# Patient Record
Sex: MALE | Race: WHITE | NOT HISPANIC OR LATINO | URBAN - METROPOLITAN AREA
[De-identification: names, ages, dates, MRNs, and addresses within clinical notes are randomized per-mention and may not be internally consistent; named-entity substitution may affect disease eponyms.]

---

## 2020-01-17 ENCOUNTER — OUTPATIENT (OUTPATIENT)
Dept: OUTPATIENT SERVICES | Facility: HOSPITAL | Age: 70
LOS: 1 days | End: 2020-01-17
Payer: MEDICARE

## 2020-01-17 VITALS
HEIGHT: 67 IN | TEMPERATURE: 98 F | DIASTOLIC BLOOD PRESSURE: 82 MMHG | SYSTOLIC BLOOD PRESSURE: 151 MMHG | RESPIRATION RATE: 16 BRPM | HEART RATE: 76 BPM | WEIGHT: 315 LBS

## 2020-01-17 DIAGNOSIS — Z98.890 OTHER SPECIFIED POSTPROCEDURAL STATES: Chronic | ICD-10-CM

## 2020-01-17 DIAGNOSIS — Z98.84 BARIATRIC SURGERY STATUS: Chronic | ICD-10-CM

## 2020-01-17 DIAGNOSIS — Z01.818 ENCOUNTER FOR OTHER PREPROCEDURAL EXAMINATION: ICD-10-CM

## 2020-01-17 DIAGNOSIS — K21.9 GASTRO-ESOPHAGEAL REFLUX DISEASE WITHOUT ESOPHAGITIS: ICD-10-CM

## 2020-01-17 LAB
ANION GAP SERPL CALC-SCNC: 4 MMOL/L — LOW (ref 5–17)
APTT BLD: 30.7 SEC — SIGNIFICANT CHANGE UP (ref 27.5–36.3)
BASOPHILS # BLD AUTO: 0.02 K/UL — SIGNIFICANT CHANGE UP (ref 0–0.2)
BASOPHILS NFR BLD AUTO: 0.2 % — SIGNIFICANT CHANGE UP (ref 0–2)
BUN SERPL-MCNC: 15 MG/DL — SIGNIFICANT CHANGE UP (ref 7–23)
CALCIUM SERPL-MCNC: 9 MG/DL — SIGNIFICANT CHANGE UP (ref 8.5–10.1)
CHLORIDE SERPL-SCNC: 106 MMOL/L — SIGNIFICANT CHANGE UP (ref 96–108)
CO2 SERPL-SCNC: 30 MMOL/L — SIGNIFICANT CHANGE UP (ref 22–31)
CREAT SERPL-MCNC: 1.27 MG/DL — SIGNIFICANT CHANGE UP (ref 0.5–1.3)
EOSINOPHIL # BLD AUTO: 0.19 K/UL — SIGNIFICANT CHANGE UP (ref 0–0.5)
EOSINOPHIL NFR BLD AUTO: 2.2 % — SIGNIFICANT CHANGE UP (ref 0–6)
GLUCOSE SERPL-MCNC: 126 MG/DL — HIGH (ref 70–99)
HBA1C BLD-MCNC: 7.1 % — HIGH (ref 4–5.6)
HCT VFR BLD CALC: 41.6 % — SIGNIFICANT CHANGE UP (ref 39–50)
HGB BLD-MCNC: 13.4 G/DL — SIGNIFICANT CHANGE UP (ref 13–17)
IMM GRANULOCYTES NFR BLD AUTO: 0.5 % — SIGNIFICANT CHANGE UP (ref 0–1.5)
INR BLD: 1.4 RATIO — HIGH (ref 0.88–1.16)
LYMPHOCYTES # BLD AUTO: 2.15 K/UL — SIGNIFICANT CHANGE UP (ref 1–3.3)
LYMPHOCYTES # BLD AUTO: 24.7 % — SIGNIFICANT CHANGE UP (ref 13–44)
MCHC RBC-ENTMCNC: 29.8 PG — SIGNIFICANT CHANGE UP (ref 27–34)
MCHC RBC-ENTMCNC: 32.2 GM/DL — SIGNIFICANT CHANGE UP (ref 32–36)
MCV RBC AUTO: 92.7 FL — SIGNIFICANT CHANGE UP (ref 80–100)
MONOCYTES # BLD AUTO: 0.55 K/UL — SIGNIFICANT CHANGE UP (ref 0–0.9)
MONOCYTES NFR BLD AUTO: 6.3 % — SIGNIFICANT CHANGE UP (ref 2–14)
NEUTROPHILS # BLD AUTO: 5.77 K/UL — SIGNIFICANT CHANGE UP (ref 1.8–7.4)
NEUTROPHILS NFR BLD AUTO: 66.1 % — SIGNIFICANT CHANGE UP (ref 43–77)
PLATELET # BLD AUTO: 212 K/UL — SIGNIFICANT CHANGE UP (ref 150–400)
POTASSIUM SERPL-MCNC: 4 MMOL/L — SIGNIFICANT CHANGE UP (ref 3.5–5.3)
POTASSIUM SERPL-SCNC: 4 MMOL/L — SIGNIFICANT CHANGE UP (ref 3.5–5.3)
PROTHROM AB SERPL-ACNC: 15.7 SEC — HIGH (ref 10–12.9)
RBC # BLD: 4.49 M/UL — SIGNIFICANT CHANGE UP (ref 4.2–5.8)
RBC # FLD: 13 % — SIGNIFICANT CHANGE UP (ref 10.3–14.5)
SODIUM SERPL-SCNC: 140 MMOL/L — SIGNIFICANT CHANGE UP (ref 135–145)
WBC # BLD: 8.72 K/UL — SIGNIFICANT CHANGE UP (ref 3.8–10.5)
WBC # FLD AUTO: 8.72 K/UL — SIGNIFICANT CHANGE UP (ref 3.8–10.5)

## 2020-01-17 PROCEDURE — 85730 THROMBOPLASTIN TIME PARTIAL: CPT

## 2020-01-17 PROCEDURE — 85025 COMPLETE CBC W/AUTO DIFF WBC: CPT

## 2020-01-17 PROCEDURE — 93010 ELECTROCARDIOGRAM REPORT: CPT

## 2020-01-17 PROCEDURE — 36415 COLL VENOUS BLD VENIPUNCTURE: CPT

## 2020-01-17 PROCEDURE — G0463: CPT | Mod: 25

## 2020-01-17 PROCEDURE — 83036 HEMOGLOBIN GLYCOSYLATED A1C: CPT

## 2020-01-17 PROCEDURE — 85610 PROTHROMBIN TIME: CPT

## 2020-01-17 PROCEDURE — 80048 BASIC METABOLIC PNL TOTAL CA: CPT

## 2020-01-17 PROCEDURE — 93005 ELECTROCARDIOGRAM TRACING: CPT

## 2020-01-17 NOTE — H&P PST ADULT - NSICDXFAMILYHX_GEN_ALL_CORE_FT
FAMILY HISTORY:  Family history of Alzheimer's disease, mother  Family history of gastric cancer, father  FH: diabetes mellitus, mother

## 2020-01-17 NOTE — H&P PST ADULT - NSICDXPASTSURGICALHX_GEN_ALL_CORE_FT
PAST SURGICAL HISTORY:  H/O left inguinal hernia repair questionable exact site.    History of adjustable gastric banding 2007    History of other surgery left lower extremity surgery dure to traumatic surgery. "50 years ago"

## 2020-01-17 NOTE — H&P PST ADULT - NS PRO ABUSE SCREEN AFRAID ANYONE YN
[facility]    [address] [facility] [address]    Phone:  [phone]    Fax:  [phone]       Thank You for choosing us for your health care visit. We are glad to serve you and happy to provide you with this summary of your visit. Please help us to ensure we have accurate records. If you find anything that needs to be changed, please let our staff know as soon as possible.          Your Demographic Information     Patient Name Sex     Timbo Diaz Male 1981       Ethnic Group Patient Race    Not of  or  Origin White      Your Visit Details     Date & Time Provider Department    2017 12:30 PM CUATE Lantigua [facility]      Your To Do List     Follow-Up    Return for follow-up 1 week after colonoscopy.      Conditions Discussed Today or Order-Related Diagnoses        Comments    Lower abdominal pain    -  Primary     Altered bowel habits         History of Crohn's disease           Your Vitals Were     BP Pulse Temp Height Weight BMI    122/84 72 98.4 °F (36.9 °C) (Oral) 6' 2\" (1.88 m) 142 lb 12.8 oz (64.8 kg) 18.33 kg/m2    Smoking Status                   Current Every Day Smoker           Medications Prescribed or Re-Ordered Today     None      Your Current Medications Are        Disp Refills Start End    PEG-KCl-NaCl-NaSulf-Na Asc-C (MOVIPREP) 100 G solution 2000 mL 0 2017    Sig - Route: Take 2,000 mLs by mouth once for 1 dose. - Oral    Class: Eprescribe      Discontinued Medications        Reason for Discontinue    cephALEXin (KEFLEX) 500 MG capsule Therapy Completed    sulfamethoxazole-trimethoprim (BACTRIM DS) 800-160 MG per tablet Therapy Completed    hydrocodone-acetaminophen (VICODIN) 5-500 MG per tablet Therapy Completed    ibuprofen (MOTRIN) 200 MG tablet Therapy Completed      Allergies     Penicillins RASH      Problem List as of 2017     Lower abdominal pain    History of Crohn's disease    Altered bowel habits              Patient Instructions      Low-Fiber Diet  Eating a low-fiber diet means eating foods that don’t have much fiber. These foods are easy to digest.  Most of the fiber that you eat passes undigested through your bowel. This is what forms stool. Low-fiber foods can help to slow down your bowel movements. When you eat a low-fiber diet, you have fewer stools. This lets your intestine rest.  Your healthcare provider will tell you how long you need to be on this diet. It may only be for a short time. Low-fiber foods often don't give you all the nutrients you need to keep healthy. Your provider may have you take certain vitamins while you are on this diet.    Reasons to eat a low-fiber diet  The goal of a low-fiber diet is to limit the size and number of your stools. It may be prescribed if you:  · Are having chemotherapy or radiation treatments  · Have had intestinal surgery  · Have a condition that affects your intestine, such as irritable bowel syndrome, Crohn’s disease, ulcerative colitis, or diverticulitis  General guidelines for a low-fiber diet  In general, a low-fiber diet means having fewer than 13 grams of fiber a day. Your provider may give you a list of things you can and can’t eat or drink. Read food labels. Choose foods and drinks that have as close to zero grams of fiber as possible. Here are general guidelines to follow:  Breads, pasta, cereal, rice, and other starches (6 to 11 servings daily)  · What to choose: white bread, biscuits, muffins, and white rolls; plain crackers; waffles; white pasta; white rice; cream of wheat; grits; white pancakes; corn flakes; cooked potatoes without skin.  Fiber content of these foods should be less than 0.5 (1/2) gram per serving.  · What to avoid: whole-wheat or whole-grain breads, crackers, and pasta; breads with seeds or nuts; wheat germ; jaleel crackers; cornbread; wild or brown rice; whole-grain, bran, and granola cereals; cereals with  seeds, nuts, coconut, or dried fruit; potatoes with skin  Milk and dairy (2 servings daily)  · What to choose: milk, buttermilk; yogurt or ice cream without seeds or nuts; custard or pudding; sour cream; cheese and cottage cheese  · What to avoid: ice cream and yogurt with seeds, nuts, or fruit chunks  Fruit (2 to 4 servings daily)  · What to choose: ripe banana; ripe nectarine, peach, apricot, papaya, and plum; soft honeydew melon and cantaloupe; cooked or canned fruit without skin or seeds (not sweetened with sorbitol); applesauce; strained fruit juice (without pulp)  · What to avoid: raw or dried fruit; all berries; raisins; canned and raw pineapple; prunes and prune juice; fruit juice with pulp  Vegetables (3 to 5 servings daily)  · What to choose: well-cooked or canned vegetables without seeds, such as spinach, eggplant, green and wax beans, carrots, yellow squash, pumpkin; lettuce on a sandwich  · What to avoid: all raw or steamed vegetables; vegetables with seeds, such as unstrained tomato sauce; green peas; lima beans; broccoli; corn; parsnips  Meats and protein (4 to 6 ounces daily)  · What to choose: tender, well-cooked meat, including ground meat, poultry, and fish; eggs; tofu; creamy peanut butter  · What to avoid: tough, chewy meat with gristle; peas, including split, yellow, and black-eyed; beans, including navy, lima, black, garbanzo, soy, de leon, and lentil; peanuts and crunchy peanut butter   Fats, oils, sauces, condiments (fewer than 8 teaspoons daily)  · What to choose: butter, magarine, oils, whipped cream, sour cream, mayonnaise, smooth dressings and sauces; plain gravy; smooth condiments  · What to avoid: dressing with seeds or fruit chunks; pickles and relishes  Other foods and drinks  · What to choose: water; plain gelatin; plain puddings; pretzels; plain cookies and cakes; honey, syrup; decaffeinated drinks, including tea and coffee    · What to avoid: popcorn; potato chips; spicy foods;  fried, greasy foods; alcohol (ask your provider); marmalade, jam, and preserves; desserts that have seeds, nuts, coconut, dried fruit, whole grains or bran; candy that has seeds or nuts; drinks sweetened with sorbitol or other sugar substitutes; caffeinated drinks, including tea, coffee, soda, and energy drinks  © 6071-6391 [employer]. [address] [place] [address], [place] [address]. All rights reserved. This information is not intended as a substitute for professional medical care. Always follow your healthcare professional's instructions.              no

## 2020-01-17 NOTE — H&P PST ADULT - NSICDXPASTMEDICALHX_GEN_ALL_CORE_FT
PAST MEDICAL HISTORY:  Asthma     DVT, lower extremity right - 2015    HTN (hypertension)     Hyperlipidemia     Lower back pain     Morbid obesity     Obstructive sleep apnea CAP Machine    Peripheral edema     Pulmonary embolism 2015    Type 2 diabetes mellitus

## 2020-01-17 NOTE — H&P PST ADULT - ASSESSMENT
69 years old male present to PST prior to upper endoscopy with Dr. Mondragon.    Plan  1. Expect a call from Endoscopy the day before your procedure between 11am and 3pm.  2. Follow the GI doctor's instructions for preparation  and day before procedure activities.  3. Follow the GI doctor's  instructions for medications.

## 2020-01-18 DIAGNOSIS — K21.9 GASTRO-ESOPHAGEAL REFLUX DISEASE WITHOUT ESOPHAGITIS: ICD-10-CM

## 2020-01-18 DIAGNOSIS — Z01.818 ENCOUNTER FOR OTHER PREPROCEDURAL EXAMINATION: ICD-10-CM

## 2020-01-24 ENCOUNTER — OUTPATIENT (OUTPATIENT)
Dept: OUTPATIENT SERVICES | Facility: HOSPITAL | Age: 70
LOS: 1 days | Discharge: HOME | End: 2020-01-24
Payer: MEDICARE

## 2020-01-24 VITALS
OXYGEN SATURATION: 95 % | HEART RATE: 94 BPM | SYSTOLIC BLOOD PRESSURE: 159 MMHG | DIASTOLIC BLOOD PRESSURE: 80 MMHG | RESPIRATION RATE: 21 BRPM | WEIGHT: 315 LBS | HEIGHT: 66 IN | TEMPERATURE: 97 F

## 2020-01-24 DIAGNOSIS — Z98.84 BARIATRIC SURGERY STATUS: Chronic | ICD-10-CM

## 2020-01-24 DIAGNOSIS — Z98.890 OTHER SPECIFIED POSTPROCEDURAL STATES: Chronic | ICD-10-CM

## 2020-01-24 DIAGNOSIS — E66.01 MORBID (SEVERE) OBESITY DUE TO EXCESS CALORIES: ICD-10-CM

## 2020-01-24 DIAGNOSIS — K21.9 GASTRO-ESOPHAGEAL REFLUX DISEASE WITHOUT ESOPHAGITIS: ICD-10-CM

## 2020-01-24 PROCEDURE — 88312 SPECIAL STAINS GROUP 1: CPT

## 2020-01-24 PROCEDURE — 88312 SPECIAL STAINS GROUP 1: CPT | Mod: 26

## 2020-01-24 PROCEDURE — 88313 SPECIAL STAINS GROUP 2: CPT

## 2020-01-24 PROCEDURE — 88313 SPECIAL STAINS GROUP 2: CPT | Mod: 26

## 2020-01-24 PROCEDURE — 88305 TISSUE EXAM BY PATHOLOGIST: CPT | Mod: 26

## 2020-01-24 PROCEDURE — 88305 TISSUE EXAM BY PATHOLOGIST: CPT

## 2020-01-24 RX ORDER — ATORVASTATIN CALCIUM 80 MG/1
1 TABLET, FILM COATED ORAL
Qty: 0 | Refills: 0 | DISCHARGE

## 2020-01-24 RX ORDER — BENAZEPRIL HYDROCHLORIDE AND HYDROCHLOROTHIAZIDE 10; 12.5 MG/1; MG/1
1 TABLET, FILM COATED ORAL
Qty: 0 | Refills: 0 | DISCHARGE

## 2020-01-24 RX ORDER — APIXABAN 2.5 MG/1
1 TABLET, FILM COATED ORAL
Qty: 0 | Refills: 0 | DISCHARGE

## 2020-01-24 RX ORDER — METFORMIN HYDROCHLORIDE 850 MG/1
1 TABLET ORAL
Qty: 0 | Refills: 0 | DISCHARGE

## 2020-01-24 NOTE — ASU PATIENT PROFILE, ADULT - PMH
normal balance Asthma    DVT, lower extremity  right - 2015  HTN (hypertension)    Hyperlipidemia    Lower back pain    Morbid obesity    Obstructive sleep apnea  CAP Machine  Peripheral edema    Pulmonary embolism  2015  Type 2 diabetes mellitus

## 2020-01-24 NOTE — ASU PATIENT PROFILE, ADULT - PSH
H/O left inguinal hernia repair  questionable exact site.  History of adjustable gastric banding  2007  History of other surgery  left lower extremity surgery dure to traumatic surgery. "50 years ago"

## 2020-01-28 DIAGNOSIS — K21.0 GASTRO-ESOPHAGEAL REFLUX DISEASE WITH ESOPHAGITIS: ICD-10-CM

## 2020-01-28 DIAGNOSIS — R13.10 DYSPHAGIA, UNSPECIFIED: ICD-10-CM

## 2020-01-28 DIAGNOSIS — E11.9 TYPE 2 DIABETES MELLITUS WITHOUT COMPLICATIONS: ICD-10-CM

## 2020-01-28 DIAGNOSIS — I10 ESSENTIAL (PRIMARY) HYPERTENSION: ICD-10-CM

## 2020-01-28 DIAGNOSIS — Z99.89 DEPENDENCE ON OTHER ENABLING MACHINES AND DEVICES: ICD-10-CM

## 2020-01-28 DIAGNOSIS — K29.50 UNSPECIFIED CHRONIC GASTRITIS WITHOUT BLEEDING: ICD-10-CM

## 2020-01-28 DIAGNOSIS — Z86.718 PERSONAL HISTORY OF OTHER VENOUS THROMBOSIS AND EMBOLISM: ICD-10-CM

## 2020-01-28 DIAGNOSIS — Z87.891 PERSONAL HISTORY OF NICOTINE DEPENDENCE: ICD-10-CM

## 2020-01-28 DIAGNOSIS — E78.5 HYPERLIPIDEMIA, UNSPECIFIED: ICD-10-CM

## 2020-01-28 DIAGNOSIS — G47.33 OBSTRUCTIVE SLEEP APNEA (ADULT) (PEDIATRIC): ICD-10-CM

## 2020-01-28 DIAGNOSIS — Z80.0 FAMILY HISTORY OF MALIGNANT NEOPLASM OF DIGESTIVE ORGANS: ICD-10-CM

## 2020-01-28 DIAGNOSIS — Z98.84 BARIATRIC SURGERY STATUS: ICD-10-CM

## 2020-01-28 DIAGNOSIS — J45.909 UNSPECIFIED ASTHMA, UNCOMPLICATED: ICD-10-CM

## 2020-01-28 DIAGNOSIS — E66.01 MORBID (SEVERE) OBESITY DUE TO EXCESS CALORIES: ICD-10-CM

## 2020-01-28 DIAGNOSIS — K31.7 POLYP OF STOMACH AND DUODENUM: ICD-10-CM

## 2020-01-28 DIAGNOSIS — K22.10 ULCER OF ESOPHAGUS WITHOUT BLEEDING: ICD-10-CM

## 2021-09-28 NOTE — ASU PATIENT PROFILE, ADULT - NS TRANSFER DISPOSITION PATIENT BELONGINGS
Pt was advised of lab results     Triglycerides   0 - 150 mg/dL 389High      LDL Cholesterol    0 - 100 mg/dL 71      Creatinine   0.76 - 1.27 mg/dL 0.94      Sodium   136 - 145 mmol/L 140      Potassium   3.5 - 5.2 mmol/L 4.5      ALT (SGPT)   1 - 41 U/L 29      AST (SGOT)   1 - 40 U/L 18          NURIA Rivers   with patient